# Patient Record
Sex: FEMALE | URBAN - METROPOLITAN AREA
[De-identification: names, ages, dates, MRNs, and addresses within clinical notes are randomized per-mention and may not be internally consistent; named-entity substitution may affect disease eponyms.]

---

## 2022-08-05 ENCOUNTER — OFFICE VISIT (OUTPATIENT)
Dept: URBAN - METROPOLITAN AREA CLINIC 128 | Facility: CLINIC | Age: 47
End: 2022-08-05
Payer: COMMERCIAL

## 2022-08-05 ENCOUNTER — DASHBOARD ENCOUNTERS (OUTPATIENT)
Age: 47
End: 2022-08-05

## 2022-08-05 VITALS
DIASTOLIC BLOOD PRESSURE: 89 MMHG | BODY MASS INDEX: 27.68 KG/M2 | HEIGHT: 66 IN | HEART RATE: 73 BPM | TEMPERATURE: 97.6 F | OXYGEN SATURATION: 98 % | WEIGHT: 172.2 LBS | SYSTOLIC BLOOD PRESSURE: 134 MMHG

## 2022-08-05 DIAGNOSIS — Z12.11 COLON CANCER SCREENING: ICD-10-CM

## 2022-08-05 DIAGNOSIS — R10.13 EPIGASTRIC PAIN: ICD-10-CM

## 2022-08-05 PROCEDURE — 99204 OFFICE O/P NEW MOD 45 MIN: CPT | Performed by: INTERNAL MEDICINE

## 2022-08-05 RX ORDER — SODIUM PICOSULFATE, MAGNESIUM OXIDE, AND ANHYDROUS CITRIC ACID 10; 3.5; 12 MG/160ML; G/160ML; G/160ML
160 ML LIQUID ORAL ONCE
Qty: 1 KIT | Refills: 0 | OUTPATIENT
Start: 2022-08-05 | End: 2022-08-06

## 2022-08-05 NOTE — HPI-COLONOSCOPY SCREENING
Patient admits this will be her first colonoscopy. Patient denies family history of colon cancer or colon polyps. Currently having 1  bowel movement a day. Stools are  without the presence of blood, mucus, and melena. Patient denies any concerns at this time.

## 2022-09-09 ENCOUNTER — OFFICE VISIT (OUTPATIENT)
Dept: URBAN - METROPOLITAN AREA SURGERY CENTER 31 | Facility: SURGERY CENTER | Age: 47
End: 2022-09-09
Payer: COMMERCIAL

## 2022-09-09 ENCOUNTER — CLAIMS CREATED FROM THE CLAIM WINDOW (OUTPATIENT)
Dept: URBAN - METROPOLITAN AREA CLINIC 4 | Facility: CLINIC | Age: 47
End: 2022-09-09
Payer: COMMERCIAL

## 2022-09-09 DIAGNOSIS — K31.89 ACQUIRED DEFORMITY OF DUODENUM: ICD-10-CM

## 2022-09-09 DIAGNOSIS — Z12.11 COLON CANCER SCREENING: ICD-10-CM

## 2022-09-09 DIAGNOSIS — K21.00 ALKALINE REFLUX ESOPHAGITIS: ICD-10-CM

## 2022-09-09 DIAGNOSIS — K29.70 GASTRITIS, UNSPECIFIED, WITHOUT BLEEDING: ICD-10-CM

## 2022-09-09 PROCEDURE — 88312 SPECIAL STAINS GROUP 1: CPT | Performed by: PATHOLOGY

## 2022-09-09 PROCEDURE — G8907 PT DOC NO EVENTS ON DISCHARG: HCPCS | Performed by: INTERNAL MEDICINE

## 2022-09-09 PROCEDURE — 88305 TISSUE EXAM BY PATHOLOGIST: CPT | Performed by: PATHOLOGY

## 2022-09-09 PROCEDURE — 43239 EGD BIOPSY SINGLE/MULTIPLE: CPT | Performed by: INTERNAL MEDICINE

## 2022-09-09 PROCEDURE — 45378 DIAGNOSTIC COLONOSCOPY: CPT | Performed by: INTERNAL MEDICINE

## 2022-09-09 RX ORDER — PANTOPRAZOLE SODIUM 40 MG/1
1 TABLET TABLET, DELAYED RELEASE ORAL ONCE A DAY
Qty: 90 TABLET | Refills: 3 | OUTPATIENT
Start: 2022-09-09

## 2024-07-24 NOTE — HPI-ACID REFLUX
46 year old female patient presents today for consult of reflux, onset started 10 years ago. Patient describes symptoms as heartburn .  She does report intermittent epigastric pain. She denies symptoms including dysphagia, globus, sour eructations, bloating/gas, indigestion, early satiety, changes in appetite, coughing,  or changes in bowel habits.Patient denies cardiac work up. Patient states that she has tried  OTC pepcid without no relief of symptoms . Denies a family history of esophageal and gastric cancers and/or diseases. Patient has not had an EGD or Barium Swallow Test before. WFL